# Patient Record
Sex: MALE | Race: WHITE | Employment: FULL TIME | URBAN - METROPOLITAN AREA
[De-identification: names, ages, dates, MRNs, and addresses within clinical notes are randomized per-mention and may not be internally consistent; named-entity substitution may affect disease eponyms.]

---

## 2018-04-13 ENCOUNTER — OFFICE VISIT (OUTPATIENT)
Dept: URGENT CARE | Facility: PHYSICIAN GROUP | Age: 43
End: 2018-04-13
Payer: COMMERCIAL

## 2018-04-13 VITALS
HEIGHT: 70 IN | HEART RATE: 103 BPM | TEMPERATURE: 97.8 F | BODY MASS INDEX: 38.22 KG/M2 | DIASTOLIC BLOOD PRESSURE: 78 MMHG | SYSTOLIC BLOOD PRESSURE: 102 MMHG | WEIGHT: 267 LBS | OXYGEN SATURATION: 96 %

## 2018-04-13 DIAGNOSIS — R05.9 COUGH: ICD-10-CM

## 2018-04-13 DIAGNOSIS — J98.01 BRONCHOSPASM: ICD-10-CM

## 2018-04-13 PROCEDURE — 99204 OFFICE O/P NEW MOD 45 MIN: CPT | Performed by: NURSE PRACTITIONER

## 2018-04-13 RX ORDER — METHYLPREDNISOLONE 4 MG/1
4 TABLET ORAL DAILY
Qty: 1 KIT | Refills: 0 | Status: SHIPPED | OUTPATIENT
Start: 2018-04-13 | End: 2018-06-29

## 2018-04-13 RX ORDER — BENZONATATE 100 MG/1
100 CAPSULE ORAL 3 TIMES DAILY PRN
Qty: 60 CAP | Refills: 0 | Status: SHIPPED | OUTPATIENT
Start: 2018-04-13 | End: 2018-06-29

## 2018-04-13 RX ORDER — CODEINE PHOSPHATE AND GUAIFENESIN 10; 100 MG/5ML; MG/5ML
5 SOLUTION ORAL EVERY 4 HOURS PRN
Qty: 100 ML | Refills: 0 | Status: SHIPPED | OUTPATIENT
Start: 2018-04-13 | End: 2018-04-16

## 2018-04-13 RX ORDER — TADALAFIL 5 MG/1
5 TABLET ORAL PRN
COMMUNITY

## 2018-04-13 ASSESSMENT — ENCOUNTER SYMPTOMS
COUGH: 1
FEVER: 0
DIZZINESS: 0
SPUTUM PRODUCTION: 0
EYE PAIN: 0
MYALGIAS: 0
CHILLS: 0
NAUSEA: 0
VOMITING: 0
WHEEZING: 0
SORE THROAT: 0
SHORTNESS OF BREATH: 0

## 2018-04-14 NOTE — PROGRESS NOTES
Subjective:     Brant Ham is a 43 y.o. male who presents for Cough (Cough for over 1 month)  Patient presents to clinic today with complaints of a cough ×1 month. Patient was in [place] and was seen and treated with oral antibiotics mid March. Patient states  symptoms since resolved however a lingering cough has persisted. Patient states cough is Constant and worse at night, nonproductive. Patient denies any fevers, chills, body aches.     Cough   This is a new problem. The current episode started 1 to 4 weeks ago. The problem has been unchanged. The problem occurs constantly. The cough is non-productive. Associated symptoms include postnasal drip. Pertinent negatives include no chest pain, chills, fever, myalgias, nasal congestion, rash, sore throat, shortness of breath or wheezing. Nothing aggravates the symptoms. He has tried OTC cough suppressant for the symptoms. The treatment provided no relief. His past medical history is significant for bronchitis.     Past Medical History:   Diagnosis Date   • Low testosterone    History reviewed. No pertinent surgical history.  Social History     Social History   • Marital status:      Spouse name: N/A   • Number of children: N/A   • Years of education: N/A     Occupational History   • Not on file.     Social History Main Topics   • Smoking status: Never Smoker   • Smokeless tobacco: Never Used   • Alcohol use Not on file   • Drug use: Unknown   • Sexual activity: Not on file     Other Topics Concern   • Not on file     Social History Narrative   • No narrative on file    History reviewed. No pertinent family history. Review of Systems   Constitutional: Negative for chills and fever.   HENT: Positive for postnasal drip. Negative for sore throat.    Eyes: Negative for pain.   Respiratory: Positive for cough. Negative for sputum production, shortness of breath and wheezing.    Cardiovascular: Negative for chest pain.   Gastrointestinal: Negative for nausea  "and vomiting.   Genitourinary: Negative for hematuria.   Musculoskeletal: Negative for myalgias.   Skin: Negative for rash.   Neurological: Negative for dizziness.   No Known Allergies   Objective:   /78   Pulse (!) 103   Temp 36.6 °C (97.8 °F)   Ht 1.778 m (5' 10\")   Wt 121.1 kg (267 lb)   SpO2 96%   BMI 38.31 kg/m²   Physical Exam   Constitutional: He is oriented to person, place, and time. He appears well-developed and well-nourished. No distress.   HENT:   Head: Normocephalic and atraumatic.   Eyes: Conjunctivae and EOM are normal. Pupils are equal, round, and reactive to light.   Cardiovascular: Normal rate and regular rhythm.    No murmur heard.  Pulmonary/Chest: Effort normal and breath sounds normal. No respiratory distress. He has no decreased breath sounds. He has no wheezes. He has no rhonchi. He has no rales.   Coughing throughout exam on inspiration.   Abdominal: Soft. He exhibits no distension. There is no tenderness.   Neurological: He is alert and oriented to person, place, and time. He has normal reflexes. No sensory deficit.   Skin: Skin is warm and dry.   Psychiatric: He has a normal mood and affect.   Vitals reviewed.        Assessment/Plan:   Assessment    1. Cough  2. Bronchospasm-   benzonatate (TESSALON) 100 MG Cap; Take 1 Cap by mouth 3 times a day as needed for Cough.  Dispense: 60 Cap; Refill: 0  - guaifenesin-codeine (CHERATUSSIN AC) Solution oral solution; Take 5 mL by mouth every four hours as needed for Cough for up to 3 days.  Dispense: 100 mL; Refill: 0  - MethylPREDNISolone (MEDROL DOSEPAK) 4 MG Tablet Therapy Pack; Take 1 Tab by mouth every day.  Dispense: 1 Kit; Refill: 0    Patient without fevers, lung sounds clear to auscultation, no bacterial etiology suspected. Apprenda query was performed to review the . The patient appears appropriate to receive medication as prescribed.        Patient given precautionary s/sx that mandate immediate follow up and evaluation " in the ED. Advised of risks of not doing so.    DDX, Supportive care, and indications for immediate follow-up discussed with patient.    Instructed to return to clinic or nearest emergency department if we are not available for any change in condition, further concerns, or worsening of symptoms.    The patient demonstrated a good understanding and agreed with the treatment plan.;

## 2018-06-28 ENCOUNTER — HOSPITAL ENCOUNTER (OUTPATIENT)
Facility: MEDICAL CENTER | Age: 43
End: 2018-06-28
Attending: PHYSICIAN ASSISTANT
Payer: COMMERCIAL

## 2018-06-28 PROCEDURE — 87177 OVA AND PARASITES SMEARS: CPT

## 2018-06-29 ENCOUNTER — OFFICE VISIT (OUTPATIENT)
Dept: URGENT CARE | Facility: PHYSICIAN GROUP | Age: 43
End: 2018-06-29
Payer: COMMERCIAL

## 2018-06-29 VITALS
BODY MASS INDEX: 35.59 KG/M2 | OXYGEN SATURATION: 95 % | SYSTOLIC BLOOD PRESSURE: 110 MMHG | WEIGHT: 248.6 LBS | HEART RATE: 81 BPM | DIASTOLIC BLOOD PRESSURE: 60 MMHG | TEMPERATURE: 98.4 F | RESPIRATION RATE: 12 BRPM | HEIGHT: 70 IN

## 2018-06-29 DIAGNOSIS — Z78.9 RECENT FOREIGN TRAVEL: ICD-10-CM

## 2018-06-29 DIAGNOSIS — M79.9 SOFT TISSUE LESION OF FOOT: ICD-10-CM

## 2018-06-29 PROCEDURE — 99213 OFFICE O/P EST LOW 20 MIN: CPT | Performed by: PHYSICIAN ASSISTANT

## 2018-06-29 ASSESSMENT — ENCOUNTER SYMPTOMS
NEUROLOGICAL NEGATIVE: 1
MUSCULOSKELETAL NEGATIVE: 1
CARDIOVASCULAR NEGATIVE: 1
CONSTITUTIONAL NEGATIVE: 1
RESPIRATORY NEGATIVE: 1
GASTROINTESTINAL NEGATIVE: 1
ROS SKIN COMMENTS: SEE HPI

## 2018-06-29 NOTE — PROGRESS NOTES
"Subjective:      Brant Ham is a 43 y.o. male who presents with Blister (no present pain/itchiness, lumps located on the heel of L foot, 3 blisters, x3 weeks )        Blister     Patient presents today for 3 weeks of unchanging lumps, possible \"blisters\" inner aspect of his left heel.  Patient states his wife pointed it out to him a few weeks ago and he has been watching it but has not changed.  He states they are really symptom free, without pain, redness. He will directly press on them hard without pain but also without change, drainage.   He denies these spots anywhere else.  He does note change in footwear for work but that was a few months back.  He was in [place] recently and does express concern for parasites due to this.  Was in [place].  Denies any other symptoms or concerns.     Review of Systems   Constitutional: Negative.    Respiratory: Negative.    Cardiovascular: Negative.    Gastrointestinal: Negative.    Musculoskeletal: Negative.    Skin:        SEE HPI   Neurological: Negative.    Endo/Heme/Allergies: Negative.        PMH:  has a past medical history of Low testosterone.  MEDS:   Current Outpatient Prescriptions:   •  tadalafil (CIALIS) 5 MG tablet, Take 5 mg by mouth as needed for Erectile Dysfunction., Disp: , Rfl:   •  Testosterone (ANDROGEL TD), Apply  to skin as directed., Disp: , Rfl:   ALLERGIES: No Known Allergies  SURGHX: History reviewed. No pertinent surgical history.  SOCHX:  reports that he has never smoked. His smokeless tobacco use includes Chew. He reports that he drinks alcohol. He reports that he does not use drugs.  FH: Family history was reviewed, no pertinent findings to report   Objective:     /60   Pulse 81   Temp 36.9 °C (98.4 °F)   Resp 12   Ht 1.778 m (5' 10\")   Wt 112.8 kg (248 lb 9.6 oz)   SpO2 95%   BMI 35.67 kg/m²      Physical Exam   Constitutional: He is oriented to person, place, and time. He appears well-developed and well-nourished. No " distress.   HENT:   Head: Normocephalic and atraumatic.   Eyes: Conjunctivae and EOM are normal.   Neck: Normal range of motion. Neck supple.   Cardiovascular: Normal rate.    Pulmonary/Chest: Effort normal.   Musculoskeletal: Normal range of motion. He exhibits no edema or tenderness.        Feet:    Neurological: He is alert and oriented to person, place, and time.   Skin: Skin is warm and dry.   Psychiatric: He has a normal mood and affect. His behavior is normal.   Vitals reviewed.          Assessment/Plan:     1. Soft tissue lesion of foot  COMPLETE O&P    REFERRAL TO PODIATRY   2. Recent foreign travel  COMPLETE O&P         -non specific soft tissue/cutaneous nodules of medial aspect of left heel.   Possibly vascular/variscosity based on exam   -patient will be referred to follow up with Podiatry at this time to follow, recommend changing back into old work boots due to possibility shoes are causing irritation  - O&P ordered due to recent travel to [place].  The lesions due to appear to be cutaneous larva manifestation however discussed appropriate to rule out given recent travel.       Supportive care, differential diagnoses, and indications for immediate follow-up discussed with patient.   Pathogenesis of diagnosis discussed including typical length and natural progression.   Instructed to return to clinic or nearest emergency department for any change in condition, further concerns, or worsening of symptoms.  Patient states understanding of the plan of care and discharge instructions.        Ary Isaacs P.A.-C.

## 2018-07-03 DIAGNOSIS — M79.9 SOFT TISSUE LESION OF FOOT: ICD-10-CM

## 2018-07-03 DIAGNOSIS — Z78.9 RECENT FOREIGN TRAVEL: ICD-10-CM

## 2018-07-06 LAB
O+P SPEC MICRO: NORMAL
SIGNIFICANT IND 70042: NORMAL
SITE SITE: NORMAL
SOURCE SOURCE: NORMAL

## 2018-07-12 ENCOUNTER — TELEPHONE (OUTPATIENT)
Dept: URGENT CARE | Facility: PHYSICIAN GROUP | Age: 43
End: 2018-07-12

## 2018-07-12 NOTE — TELEPHONE ENCOUNTER
I called the pt and lvm with negative results. He was instructed to call back with any further questions / fv if sxs are not resolving.       Thanks

## 2020-11-28 ENCOUNTER — HOSPITAL ENCOUNTER (OUTPATIENT)
Dept: LAB | Facility: MEDICAL CENTER | Age: 45
End: 2020-11-28
Attending: ANESTHESIOLOGY
Payer: COMMERCIAL

## 2020-11-28 PROCEDURE — C9803 HOPD COVID-19 SPEC COLLECT: HCPCS

## 2020-11-28 PROCEDURE — U0003 INFECTIOUS AGENT DETECTION BY NUCLEIC ACID (DNA OR RNA); SEVERE ACUTE RESPIRATORY SYNDROME CORONAVIRUS 2 (SARS-COV-2) (CORONAVIRUS DISEASE [COVID-19]), AMPLIFIED PROBE TECHNIQUE, MAKING USE OF HIGH THROUGHPUT TECHNOLOGIES AS DESCRIBED BY CMS-2020-01-R: HCPCS

## 2020-11-29 LAB
COVID ORDER STATUS COVID19: NORMAL
SARS-COV-2 RNA RESP QL NAA+PROBE: NOTDETECTED
SPECIMEN SOURCE: NORMAL

## 2021-02-09 ENCOUNTER — PHYSICAL THERAPY (OUTPATIENT)
Dept: PHYSICAL THERAPY | Facility: REHABILITATION | Age: 46
End: 2021-02-09
Attending: ORTHOPAEDIC SURGERY
Payer: COMMERCIAL

## 2021-02-09 DIAGNOSIS — S46.011A STRAIN OF MUSCLE(S) AND TENDON(S) OF THE ROTATOR CUFF OF RIGHT SHOULDER, INITIAL ENCOUNTER: ICD-10-CM

## 2021-02-09 DIAGNOSIS — S43.431A SUPERIOR GLENOID LABRUM LESION OF RIGHT SHOULDER, INITIAL ENCOUNTER: ICD-10-CM

## 2021-02-09 DIAGNOSIS — M75.51 SUBACROMIAL BURSITIS OF RIGHT SHOULDER JOINT: ICD-10-CM

## 2021-02-09 PROCEDURE — 97162 PT EVAL MOD COMPLEX 30 MIN: CPT

## 2021-02-09 PROCEDURE — 97110 THERAPEUTIC EXERCISES: CPT

## 2021-02-09 SDOH — ECONOMIC STABILITY: GENERAL: QUALITY OF LIFE: GOOD

## 2021-02-09 ASSESSMENT — ENCOUNTER SYMPTOMS
PAIN TIMING: INTERMITTENT
PAIN TIMING: IN THE MORNING
PAIN SCALE: 0
ALLEVIATING FACTORS: POSITION CHANGE
PAIN SCALE AT HIGHEST: 3
ALLEVIATING FACTORS: STRETCHING
ALLEVIATING FACTORS: PAIN MEDICATION
QUALITY: TIGHTNESS
QUALITY: STABBING

## 2021-02-09 NOTE — OP THERAPY EVALUATION
Outpatient Physical Therapy  INITIAL EVALUATION    [facility] Physical Therapy [place] [address] [address] [employer] [facility], [address]  [facility] [address]  Phone:  [phone]    Date of Evaluation: 2021    Patient: Brant Ham  YOB: 1975  MRN: [id number]     Referring Provider: Giancarlo Knapp M.D.  [address] [place] [address]   Referring Diagnosis Superior glenoid labrum lesion of right shoulder, initial encounter [S43.431A];Subacromial bursitis of right shoulder joint [M75.51];Strain of muscle(s) and tendon(s) of the rotator cuff of right shoulder, initial encounter [S46.011A]     Time Calculation    Start time: 0230  Stop time: 0330 Time Calculation (min): 60 minutes         Chief Complaint: Shoulder Problem and Shoulder Injury    Visit Diagnoses     ICD-10-CM   1. Superior glenoid labrum lesion of right shoulder, initial encounter  S43.431A   2. Subacromial bursitis of right shoulder joint  M75.51   3. Strain of muscle(s) and tendon(s) of the rotator cuff of right shoulder, initial encounter  S46.011A         Subjective:   History of Present Illness:     Date of onset:  2021    Mechanism of injury:  The patient is a 45 year old male who reports post op (R) shoulder RTC surgery. The patient reports having decreased AROM in the (R) shoulder reaching out to  the side. The patient has limited problems with sleeping on his (R) side. He reports that his motion has graduall increase d but still feels weak and unable to perform certatin functional movements involving the (R) upper extremity.  Quality of life:  Good  Sleep disturbance:  Interrupted sleep  Pain:     Current pain ratin    At worst pain rating:  3    Quality:  Stabbing and tightness    Pain timing:  Intermittent and in the morning    Relieving factors:  Pain medication, position change and stretching    Pain Comments::  Wax on wax off car with using (R) hand; pulling up his underwear   Hand dominance:  Right  Patient  "Goals:     Other patient goals:  To increase mobility and decrease pain; to increase his symmetrical balcce       Past Medical History:   Diagnosis Date   • Low testosterone      No past surgical history on file.  Social History     Tobacco Use   • Smoking status: Never Smoker   • Smokeless tobacco: Current User     Types: Chew   Substance Use Topics   • Alcohol use: Yes     Comment: Occ     Family and Occupational History     Socioeconomic History   • Marital status:      Spouse name: Not on file   • Number of children: Not on file   • Years of education: Not on file   • Highest education level: Not on file   Occupational History   • Not on file       Objective     Tenderness     Right Shoulder  No tenderness     Active Range of Motion   Left Shoulder   Flexion: 175 degrees   Extension: 55 degrees   Abduction: 170 degrees   External rotation BTH: T2   Internal rotation BTB: T7     Right Shoulder   Flexion: 165 degrees   Extension: 55 degrees   Abduction: 160 degrees   External rotation BTH: C6   Internal rotation BTB: L3     Strength:      Left Shoulder   Planes of Motion   Flexion: 5   Extension: 5   Abduction: 5   Adduction: 5   External rotation at 0°: 5     Right Shoulder   Planes of Motion   Flexion: 4   Extension: 4+   Abduction: 4   Adduction: 4+   External rotation BTH: 4   Internal rotation BTB: 4     Tests     Right Shoulder   Positive painful arc.         Therapeutic Exercises (CPT 31710):     1. (R) UE wall walk , 3'     2. (R) UE ball on the wall , 3\"    3. Wall push-ups , 10x    Therapeutic Treatments and Modalities:     1. Manual Therapy (CPT 81349), (R) sh, PROM in end ranges of flexion, abduction and ER/IR     Time-based treatments/modalities:    Physical Therapy Timed Treatment Charges  Therapeutic exercise minutes (CPT 65804): 15 minutes      Assessment, Response and Plan:   Impairments: impaired functional mobility, impaired physical strength and lacks appropriate home exercise program  "   Assessment details:  The patient is a 45 year old male who is post op (R) shoulder RTC surgery ~ 12/3/20. He reports gradually getting back in functional motion to the (R) UE. He is limited still with reaching out to the side and sleeping to the (R) side at night; also washing and drying his car. AROm to the (R) UE is limited in flexion, abduction and ER/IR. Strength to the (R) UE is at 4/5 to 4+/5 currently. He would benefit from skilled physical therapy to address post op (R) shoulder weakness working on strength and conditioning, AROM and mobility manual therapy techniques and functional activity.  Barriers to therapy:  None  Prognosis: good    Goals:   Short Term Goals:   1) Indep with HEP   2) increase AROm in the (R) shoulder flexion/abduction by 5-10 deg  Short term goal time span:  2-4 weeks      Long Term Goals:    1) Progression/regressions advancing HEP   2) Able to perform overhead activity with (R) reaching into cabinets.  3) Increase (R) sh flexion/abduction strength to 4+/5 to 5/5 without pain.  Long term goal time span:  4-6 weeks    Plan:   Therapy options:  Physical therapy treatment to continue  Planned therapy interventions:  E Stim Unattended (CPT 08387), Functional Training, Self Care (CPT 13723), Manual Therapy (CPT 66635), Neuromuscular Re-education (CPT 14808), Self Care ADL Training (CPT 82258), Therapeutic Activities (CPT 80476) and Therapeutic Exercise (CPT 69696)  Frequency:  2x week  Duration in weeks:  6  Duration in visits:  12  Discussed with:  Patient      Functional Assessment Used        Referring provider co-signature:  I have reviewed this plan of care and my co-signature certifies the need for services.    Certification Period: 02/09/2021 to  03/23/21    Physician Signature: ________________________________ Date: ______________

## 2021-02-11 ENCOUNTER — PHYSICAL THERAPY (OUTPATIENT)
Dept: PHYSICAL THERAPY | Facility: REHABILITATION | Age: 46
End: 2021-02-11
Attending: ORTHOPAEDIC SURGERY
Payer: COMMERCIAL

## 2021-02-11 DIAGNOSIS — M75.51 SUBACROMIAL BURSITIS OF RIGHT SHOULDER JOINT: ICD-10-CM

## 2021-02-11 DIAGNOSIS — S43.431A SUPERIOR GLENOID LABRUM LESION OF RIGHT SHOULDER, INITIAL ENCOUNTER: ICD-10-CM

## 2021-02-11 PROCEDURE — 97140 MANUAL THERAPY 1/> REGIONS: CPT

## 2021-02-11 PROCEDURE — 97110 THERAPEUTIC EXERCISES: CPT

## 2021-02-11 NOTE — OP THERAPY DAILY TREATMENT
"  Outpatient Physical Therapy  DAILY TREATMENT     [facility] Physical Therapy [address], [address]  [place] [address]  Phone:  [phone]    Date: 02/11/2021    Patient: Brant Ham  YOB: 1975  MRN: [id number]     Time Calculation    Start time: 0800  Stop time: 0830 Time Calculation (min): 30 minutes         Chief Complaint: Shoulder Problem    Visit #: 2    SUBJECTIVE:  The patient reports having stiffness to the (R) shoulder following  and evaluation exercises    OBJECTIVE:  Current objective measures:        Increase mobility to the (R) shoulder       Therapeutic Exercises (CPT 14097):     1. (R) UE wall walk , 3'     2. (R) UE ball on the wall , 3\"    3. Wall push-ups , 10x    4. Towel stretch    5. Dowel sh flexion/ abduction, 20x    6. Shoulder raises     7. Shoulder shrugs    8. Prone Sh extension    9. Supine horizontal adduction     10. Sidelying ER     Therapeutic Treatments and Modalities:     1. Manual Therapy (CPT 05121), (R) sh, PROM in end ranges of flexion, abduction and ER/IR , (R) sh , PNF and PAMS with light isometric functional resistance in all directss    Time-based treatments/modalities:    Physical Therapy Timed Treatment Charges  Manual therapy minutes (CPT 78997): 10 minutes  Therapeutic exercise minutes (CPT 55854): 20 minutes      ASSESSMENT:   Response to treatment:   Patient given PROM/PAMS with light resistance in all directions; at (R) sh horizntal abduction at 90 deg patient had pain with light resitance but tolerated; Patient has some increased pain with (R) sh abduction at 120 deg using dowel     PLAN/RECOMMENDATIONS:   Plan for treatment: therapy treatment to continue next visit.  Planned interventions for next visit: continue with current treatment.       "

## 2021-02-26 ENCOUNTER — APPOINTMENT (OUTPATIENT)
Dept: PHYSICAL THERAPY | Facility: REHABILITATION | Age: 46
End: 2021-02-26
Attending: ORTHOPAEDIC SURGERY
Payer: COMMERCIAL

## 2021-03-05 ENCOUNTER — APPOINTMENT (OUTPATIENT)
Dept: PHYSICAL THERAPY | Facility: REHABILITATION | Age: 46
End: 2021-03-05
Attending: ORTHOPAEDIC SURGERY
Payer: COMMERCIAL

## 2021-03-12 ENCOUNTER — APPOINTMENT (OUTPATIENT)
Dept: PHYSICAL THERAPY | Facility: REHABILITATION | Age: 46
End: 2021-03-12
Payer: COMMERCIAL

## 2021-03-19 ENCOUNTER — APPOINTMENT (OUTPATIENT)
Dept: PHYSICAL THERAPY | Facility: REHABILITATION | Age: 46
End: 2021-03-19
Payer: COMMERCIAL

## 2021-03-26 ENCOUNTER — APPOINTMENT (OUTPATIENT)
Dept: PHYSICAL THERAPY | Facility: REHABILITATION | Age: 46
End: 2021-03-26
Payer: COMMERCIAL

## 2022-07-31 ENCOUNTER — OFFICE VISIT (OUTPATIENT)
Dept: URGENT CARE | Facility: PHYSICIAN GROUP | Age: 47
End: 2022-07-31
Payer: COMMERCIAL

## 2022-07-31 VITALS
DIASTOLIC BLOOD PRESSURE: 76 MMHG | RESPIRATION RATE: 14 BRPM | BODY MASS INDEX: 38.51 KG/M2 | SYSTOLIC BLOOD PRESSURE: 112 MMHG | HEIGHT: 69 IN | HEART RATE: 84 BPM | WEIGHT: 260 LBS | TEMPERATURE: 97.8 F | OXYGEN SATURATION: 96 %

## 2022-07-31 DIAGNOSIS — J32.9 VIRAL SINUSITIS: ICD-10-CM

## 2022-07-31 DIAGNOSIS — Z11.52 ENCOUNTER FOR SCREENING FOR COVID-19: ICD-10-CM

## 2022-07-31 DIAGNOSIS — B97.89 VIRAL SINUSITIS: ICD-10-CM

## 2022-07-31 PROBLEM — N40.1 BENIGN PROSTATIC HYPERPLASIA WITH URINARY OBSTRUCTION: Status: ACTIVE | Noted: 2021-09-24

## 2022-07-31 PROBLEM — N13.8 BENIGN PROSTATIC HYPERPLASIA WITH URINARY OBSTRUCTION: Status: ACTIVE | Noted: 2021-09-24

## 2022-07-31 PROBLEM — R79.89 LOW TESTOSTERONE: Status: ACTIVE | Noted: 2022-04-19

## 2022-07-31 PROBLEM — F51.01 PRIMARY INSOMNIA: Status: ACTIVE | Noted: 2022-04-19

## 2022-07-31 PROBLEM — N52.9 ERECTILE DYSFUNCTION: Status: ACTIVE | Noted: 2021-09-24

## 2022-07-31 PROBLEM — D75.1 SECONDARY POLYCYTHEMIA: Status: ACTIVE | Noted: 2021-09-24

## 2022-07-31 PROBLEM — E78.00 HIGH CHOLESTEROL: Status: ACTIVE | Noted: 2022-04-19

## 2022-07-31 LAB
EXTERNAL QUALITY CONTROL: NORMAL
SARS-COV+SARS-COV-2 AG RESP QL IA.RAPID: NEGATIVE

## 2022-07-31 PROCEDURE — 99203 OFFICE O/P NEW LOW 30 MIN: CPT | Mod: CS | Performed by: PHYSICIAN ASSISTANT

## 2022-07-31 PROCEDURE — 87426 SARSCOV CORONAVIRUS AG IA: CPT | Performed by: PHYSICIAN ASSISTANT

## 2022-07-31 RX ORDER — ONDANSETRON 4 MG/1
TABLET, FILM COATED ORAL
COMMUNITY
End: 2023-10-25

## 2022-07-31 RX ORDER — ATORVASTATIN CALCIUM 20 MG/1
20 TABLET, FILM COATED ORAL
COMMUNITY
Start: 2022-07-01 | End: 2024-02-11

## 2022-07-31 RX ORDER — TESTOSTERONE CYPIONATE 200 MG/ML
INJECTION, SOLUTION INTRAMUSCULAR
COMMUNITY
Start: 2022-06-13 | End: 2024-02-11

## 2022-07-31 ASSESSMENT — ENCOUNTER SYMPTOMS
DIZZINESS: 0
CONSTIPATION: 0
EYE DISCHARGE: 0
COUGH: 0
FEVER: 0
SHORTNESS OF BREATH: 0
SINUS PAIN: 0
VOMITING: 0
WHEEZING: 0
NAUSEA: 0
DIARRHEA: 0
EYE PAIN: 0
CHILLS: 0
EYE REDNESS: 0
DIAPHORESIS: 0
SORE THROAT: 0
HEADACHES: 0
ABDOMINAL PAIN: 0

## 2022-08-02 ENCOUNTER — OFFICE VISIT (OUTPATIENT)
Dept: URGENT CARE | Facility: PHYSICIAN GROUP | Age: 47
End: 2022-08-02
Payer: COMMERCIAL

## 2022-08-02 VITALS
BODY MASS INDEX: 38.51 KG/M2 | SYSTOLIC BLOOD PRESSURE: 110 MMHG | WEIGHT: 260 LBS | RESPIRATION RATE: 18 BRPM | TEMPERATURE: 97.6 F | HEIGHT: 69 IN | OXYGEN SATURATION: 100 % | DIASTOLIC BLOOD PRESSURE: 68 MMHG | HEART RATE: 99 BPM

## 2022-08-02 DIAGNOSIS — Z20.822 SUSPECTED COVID-19 VIRUS INFECTION: ICD-10-CM

## 2022-08-02 DIAGNOSIS — U07.1 COVID-19: ICD-10-CM

## 2022-08-02 DIAGNOSIS — M54.50 BILATERAL LOW BACK PAIN WITHOUT SCIATICA, UNSPECIFIED CHRONICITY: ICD-10-CM

## 2022-08-02 LAB
EXTERNAL QUALITY CONTROL: ABNORMAL
SARS-COV+SARS-COV-2 AG RESP QL IA.RAPID: POSITIVE

## 2022-08-02 PROCEDURE — 87426 SARSCOV CORONAVIRUS AG IA: CPT | Performed by: PHYSICIAN ASSISTANT

## 2022-08-02 PROCEDURE — 99214 OFFICE O/P EST MOD 30 MIN: CPT | Mod: CS | Performed by: PHYSICIAN ASSISTANT

## 2022-08-02 RX ORDER — CYCLOBENZAPRINE HCL 5 MG
5 TABLET ORAL 3 TIMES DAILY PRN
Qty: 12 TABLET | Refills: 0 | Status: SHIPPED | OUTPATIENT
Start: 2022-08-02 | End: 2023-10-25

## 2022-08-02 ASSESSMENT — ENCOUNTER SYMPTOMS
RHINORRHEA: 1
WHEEZING: 0
SORE THROAT: 0
HEADACHES: 1
HEMOPTYSIS: 0
CHILLS: 0
FEVER: 0
MYALGIAS: 1
COUGH: 1

## 2023-10-25 PROBLEM — S92.351A CLOSED DISPLACED FRACTURE OF FIFTH METATARSAL BONE OF RIGHT FOOT: Status: ACTIVE | Noted: 2023-10-25

## 2024-02-11 ENCOUNTER — OFFICE VISIT (OUTPATIENT)
Dept: URGENT CARE | Facility: PHYSICIAN GROUP | Age: 49
End: 2024-02-11
Payer: COMMERCIAL

## 2024-02-11 VITALS
DIASTOLIC BLOOD PRESSURE: 70 MMHG | WEIGHT: 268 LBS | SYSTOLIC BLOOD PRESSURE: 118 MMHG | OXYGEN SATURATION: 94 % | TEMPERATURE: 97.8 F | BODY MASS INDEX: 39.69 KG/M2 | RESPIRATION RATE: 16 BRPM | HEIGHT: 69 IN | HEART RATE: 106 BPM

## 2024-02-11 DIAGNOSIS — R05.1 ACUTE COUGH: ICD-10-CM

## 2024-02-11 DIAGNOSIS — M94.0 COSTOCHONDRITIS, ACUTE: ICD-10-CM

## 2024-02-11 PROBLEM — N52.9 ERECTILE DYSFUNCTION: Status: ACTIVE | Noted: 2021-09-23

## 2024-02-11 PROBLEM — D75.1 SECONDARY POLYCYTHEMIA: Status: ACTIVE | Noted: 2021-09-23

## 2024-02-11 PROBLEM — R74.01 ELEVATED LIVER TRANSAMINASE LEVEL: Status: ACTIVE | Noted: 2023-06-28

## 2024-02-11 PROBLEM — N52.01 ERECTILE DYSFUNCTION DUE TO ARTERIAL INSUFFICIENCY: Status: ACTIVE | Noted: 2022-09-29

## 2024-02-11 PROBLEM — G47.30 MILD SLEEP APNEA: Status: ACTIVE | Noted: 2023-06-28

## 2024-02-11 PROCEDURE — 99213 OFFICE O/P EST LOW 20 MIN: CPT

## 2024-02-11 PROCEDURE — 3078F DIAST BP <80 MM HG: CPT

## 2024-02-11 PROCEDURE — 3074F SYST BP LT 130 MM HG: CPT

## 2024-02-11 RX ORDER — TESTOSTERONE CYPIONATE 200 MG/ML
INJECTION, SOLUTION INTRAMUSCULAR
COMMUNITY

## 2024-02-11 RX ORDER — ATORVASTATIN CALCIUM 10 MG/1
1 TABLET, FILM COATED ORAL
COMMUNITY
Start: 2023-12-27 | End: 2024-02-11

## 2024-02-11 RX ORDER — CYCLOBENZAPRINE HCL 5 MG
1 TABLET ORAL 3 TIMES DAILY PRN
COMMUNITY
End: 2024-02-11

## 2024-02-11 RX ORDER — BENZONATATE 100 MG/1
100 CAPSULE ORAL 3 TIMES DAILY PRN
Qty: 30 CAPSULE | Refills: 0 | Status: SHIPPED | OUTPATIENT
Start: 2024-02-11

## 2024-02-11 RX ORDER — TADALAFIL 5 MG/1
1 TABLET ORAL
COMMUNITY
Start: 2023-07-04 | End: 2024-02-11

## 2024-02-11 RX ORDER — ATORVASTATIN CALCIUM 20 MG/1
1 TABLET, FILM COATED ORAL
COMMUNITY
End: 2024-02-11

## 2024-02-11 ASSESSMENT — ENCOUNTER SYMPTOMS
COUGH: 1
CHILLS: 0
FEVER: 0
MYALGIAS: 0

## 2024-02-11 NOTE — PROGRESS NOTES
Subjective     Brant Ham is a 48 y.o. male who presents with Cough (Cough when lying down, x9 weeks  )            Cough  This is a new problem. The current episode started 1 to 4 weeks ago. The problem has been unchanged. The cough is Non-productive. Pertinent negatives include no chills, fever or myalgias. The symptoms are aggravated by lying down. Treatments tried: Nyquil. The treatment provided moderate relief.     Patient presents with symptoms that started 9 weeks ago.  He endorses cough that is mostly dry but intermittently productive of clear mucus.  Coughing is worse at night when he is laying supine.  He denies any fever, chills.  He does report rhinorrhea and nasal congestion that is chronic but currently not taking any medications for this.  He takes NyQuil at night which provided significant relief.  He reports having taken Tessalon Perles in the past which provided significant relief as well.  Patient also reports some pain in the right anterior chest wall area by the ribs worse with deep breathing.    Patient's current problem list, medications, and past medical/surgical history were reviewed in Epic.    PMH:  has a past medical history of Low testosterone.  MEDS:   Current Outpatient Medications:     testosterone cypionate (DEPO-TESTOSTERONE) 200 MG/ML injection, testosterone cypionate 200 mg/mL intramuscular oil  INJECT 1 ML EVERY WEEK BY INTRAMUSCULAR ROUTE FOR 91 DAYS., Disp: , Rfl:     tadalafil (CIALIS) 5 MG tablet, Take 5 mg by mouth as needed for Erectile Dysfunction., Disp: , Rfl:   ALLERGIES: No Known Allergies  SURGHX:   Past Surgical History:   Procedure Laterality Date    [place] OPEN TREATMENT METATARSAL FRACTURE EACH Right 10/31/2023    Procedure: RIGHT FOOT NO FRACTURE OPEN REDUCTION INTERNAL FIXATION;  Surgeon: Med Chew M.D.;  Location: [facility];  Service: Orthopedics     SOCHX:  reports that he has never smoked. His smokeless tobacco use includes  "chew. He reports current alcohol use. He reports that he does not use drugs.  FH: Reviewed with patient, not pertinent to this visit.     Review of Systems   Constitutional:  Negative for chills, fever and malaise/fatigue.   HENT:  Positive for congestion (chronic).    Respiratory:  Positive for cough.    Musculoskeletal:  Negative for myalgias.   All other systems reviewed and are negative.             Objective     /70 (BP Location: Left arm, Patient Position: Sitting, BP Cuff Size: Large adult)   Pulse (!) 106   Temp 36.6 °C (97.8 °F) (Temporal)   Resp 16   Ht 1.753 m (5' 9\")   Wt 122 kg (268 lb)   SpO2 94%   BMI 39.58 kg/m²      Physical Exam  Vitals reviewed.   Constitutional:       Appearance: Normal appearance.   HENT:      Head: Normocephalic.      Nose: Nose normal.   Eyes:      Extraocular Movements: Extraocular movements intact.   Cardiovascular:      Rate and Rhythm: Normal rate and regular rhythm.      Pulses: Normal pulses.      Heart sounds: Normal heart sounds.   Pulmonary:      Effort: Pulmonary effort is normal.      Breath sounds: Normal breath sounds. No wheezing or rales.   Musculoskeletal:         General: Normal range of motion.      Cervical back: Normal range of motion.   Skin:     General: Skin is warm.   Neurological:      General: No focal deficit present.      Mental Status: He is alert.   Psychiatric:         Mood and Affect: Mood normal.         Behavior: Behavior normal.                 Assessment & Plan        1. Acute cough    - benzonatate (TESSALON) 100 MG Cap; Take 1 Capsule by mouth 3 times a day as needed for Cough.  Dispense: 30 Capsule; Refill: 0    2. Costochondritis, acute    Patient's physical examination is unremarkable.  His lungs are clear to auscultation.  Discussed possible causes including but not limited to environmental allergies, GERD, or her upper respiratory tract infection likely viral.  May take Tessalon Perles 3 times daily as needed for cough.  " Advised to take OTC antihistamine and Flonase nasal spray daily for allergies.  Recommended not going to bed right after heavy meal and keeping head elevated.  May take Tums or PPIs to eliminate GERD as a cause of the cough.  Patient most likely has acute costochondritis from coughing.  May take NSAIDs as needed.  Ice pack/cold compress to area for additional relief.  Instructed to return to clinic if worsening or persistent symptoms.  Discussed treatment plan with patient, she is agreeable and verbalized understanding.  Educated patient on signs and symptoms watch out for, when to return to the clinic or go to the ER.    Electronically Signed by KSENIA Schafer

## 2024-06-28 ENCOUNTER — HOSPITAL ENCOUNTER (OUTPATIENT)
Dept: LAB | Facility: MEDICAL CENTER | Age: 49
End: 2024-06-28
Payer: COMMERCIAL

## 2024-06-28 LAB
ALBUMIN SERPL BCP-MCNC: 4.5 G/DL (ref 3.2–4.9)
ALBUMIN/GLOB SERPL: 1.7 G/DL
ALP SERPL-CCNC: 59 U/L (ref 30–99)
ALT SERPL-CCNC: 42 U/L (ref 2–50)
ANION GAP SERPL CALC-SCNC: 13 MMOL/L (ref 7–16)
AST SERPL-CCNC: 30 U/L (ref 12–45)
BASOPHILS # BLD AUTO: 0.8 % (ref 0–1.8)
BASOPHILS # BLD: 0.05 K/UL (ref 0–0.12)
BILIRUB SERPL-MCNC: 0.8 MG/DL (ref 0.1–1.5)
BUN SERPL-MCNC: 22 MG/DL (ref 8–22)
CALCIUM ALBUM COR SERPL-MCNC: 8.9 MG/DL (ref 8.5–10.5)
CALCIUM SERPL-MCNC: 9.3 MG/DL (ref 8.5–10.5)
CHLORIDE SERPL-SCNC: 103 MMOL/L (ref 96–112)
CHOLEST SERPL-MCNC: 179 MG/DL (ref 100–199)
CO2 SERPL-SCNC: 22 MMOL/L (ref 20–33)
CREAT SERPL-MCNC: 1.32 MG/DL (ref 0.5–1.4)
EOSINOPHIL # BLD AUTO: 0.41 K/UL (ref 0–0.51)
EOSINOPHIL NFR BLD: 7 % (ref 0–6.9)
ERYTHROCYTE [DISTWIDTH] IN BLOOD BY AUTOMATED COUNT: 38.5 FL (ref 35.9–50)
GFR SERPLBLD CREATININE-BSD FMLA CKD-EPI: 66 ML/MIN/1.73 M 2
GLOBULIN SER CALC-MCNC: 2.6 G/DL (ref 1.9–3.5)
GLUCOSE SERPL-MCNC: 105 MG/DL (ref 65–99)
HCT VFR BLD AUTO: 47 % (ref 42–52)
HDLC SERPL-MCNC: 64 MG/DL
HGB BLD-MCNC: 16.7 G/DL (ref 14–18)
IMM GRANULOCYTES # BLD AUTO: 0.01 K/UL (ref 0–0.11)
IMM GRANULOCYTES NFR BLD AUTO: 0.2 % (ref 0–0.9)
LDLC SERPL CALC-MCNC: 103 MG/DL
LYMPHOCYTES # BLD AUTO: 2.03 K/UL (ref 1–4.8)
LYMPHOCYTES NFR BLD: 34.5 % (ref 22–41)
MCH RBC QN AUTO: 32.7 PG (ref 27–33)
MCHC RBC AUTO-ENTMCNC: 35.5 G/DL (ref 32.3–36.5)
MCV RBC AUTO: 92 FL (ref 81.4–97.8)
MONOCYTES # BLD AUTO: 0.52 K/UL (ref 0–0.85)
MONOCYTES NFR BLD AUTO: 8.8 % (ref 0–13.4)
NEUTROPHILS # BLD AUTO: 2.87 K/UL (ref 1.82–7.42)
NEUTROPHILS NFR BLD: 48.7 % (ref 44–72)
NRBC # BLD AUTO: 0 K/UL
NRBC BLD-RTO: 0 /100 WBC (ref 0–0.2)
PLATELET # BLD AUTO: 170 K/UL (ref 164–446)
PMV BLD AUTO: 11.4 FL (ref 9–12.9)
POTASSIUM SERPL-SCNC: 4.1 MMOL/L (ref 3.6–5.5)
PROT SERPL-MCNC: 7.1 G/DL (ref 6–8.2)
RBC # BLD AUTO: 5.11 M/UL (ref 4.7–6.1)
SODIUM SERPL-SCNC: 138 MMOL/L (ref 135–145)
TRIGL SERPL-MCNC: 60 MG/DL (ref 0–149)
WBC # BLD AUTO: 5.9 K/UL (ref 4.8–10.8)

## 2024-06-28 PROCEDURE — 36415 COLL VENOUS BLD VENIPUNCTURE: CPT

## 2024-06-28 PROCEDURE — 84403 ASSAY OF TOTAL TESTOSTERONE: CPT

## 2024-06-28 PROCEDURE — 80061 LIPID PANEL: CPT

## 2024-06-28 PROCEDURE — 80053 COMPREHEN METABOLIC PANEL: CPT

## 2024-06-28 PROCEDURE — 84402 ASSAY OF FREE TESTOSTERONE: CPT

## 2024-06-28 PROCEDURE — 85025 COMPLETE CBC W/AUTO DIFF WBC: CPT

## 2024-06-28 PROCEDURE — 84270 ASSAY OF SEX HORMONE GLOBUL: CPT

## 2024-06-30 LAB
SHBG SERPL-SCNC: 68 NMOL/L (ref 19–76)
TESTOST FREE MFR SERPL: 1.3 % (ref 1.6–2.9)
TESTOST FREE SERPL-MCNC: 96 PG/ML (ref 47–244)
TESTOST SERPL-MCNC: 759 NG/DL (ref 300–890)

## 2025-04-09 ENCOUNTER — OFFICE VISIT (OUTPATIENT)
Dept: URGENT CARE | Facility: PHYSICIAN GROUP | Age: 50
End: 2025-04-09
Payer: COMMERCIAL

## 2025-04-09 VITALS
TEMPERATURE: 97.5 F | OXYGEN SATURATION: 95 % | RESPIRATION RATE: 16 BRPM | HEIGHT: 69 IN | WEIGHT: 268 LBS | HEART RATE: 95 BPM | DIASTOLIC BLOOD PRESSURE: 74 MMHG | BODY MASS INDEX: 39.69 KG/M2 | SYSTOLIC BLOOD PRESSURE: 118 MMHG

## 2025-04-09 DIAGNOSIS — J01.90 ACUTE BACTERIAL RHINOSINUSITIS: ICD-10-CM

## 2025-04-09 DIAGNOSIS — J40 BRONCHITIS: ICD-10-CM

## 2025-04-09 DIAGNOSIS — B96.89 ACUTE BACTERIAL RHINOSINUSITIS: ICD-10-CM

## 2025-04-09 DIAGNOSIS — R06.2 WHEEZING: ICD-10-CM

## 2025-04-09 PROBLEM — M54.50 CHRONIC RIGHT-SIDED LOW BACK PAIN WITHOUT SCIATICA: Status: ACTIVE | Noted: 2024-06-24

## 2025-04-09 PROBLEM — G89.29 CHRONIC RIGHT-SIDED LOW BACK PAIN WITHOUT SCIATICA: Status: ACTIVE | Noted: 2024-06-24

## 2025-04-09 PROCEDURE — 99213 OFFICE O/P EST LOW 20 MIN: CPT

## 2025-04-09 PROCEDURE — 3078F DIAST BP <80 MM HG: CPT

## 2025-04-09 PROCEDURE — 94640 AIRWAY INHALATION TREATMENT: CPT | Mod: 76

## 2025-04-09 PROCEDURE — 3074F SYST BP LT 130 MM HG: CPT

## 2025-04-09 RX ORDER — BENZONATATE 200 MG/1
200 CAPSULE ORAL 3 TIMES DAILY PRN
Qty: 60 CAPSULE | Refills: 0 | Status: SHIPPED | OUTPATIENT
Start: 2025-04-09

## 2025-04-09 RX ORDER — IBUPROFEN 600 MG/1
TABLET, FILM COATED ORAL
COMMUNITY

## 2025-04-09 RX ORDER — GUAIFENESIN 600 MG/1
600 TABLET, EXTENDED RELEASE ORAL EVERY 12 HOURS
Qty: 28 TABLET | Refills: 0 | Status: SHIPPED | OUTPATIENT
Start: 2025-04-09 | End: 2025-04-23

## 2025-04-09 RX ORDER — GABAPENTIN 300 MG/1
CAPSULE ORAL
COMMUNITY

## 2025-04-09 RX ORDER — METHYLPREDNISOLONE 4 MG/1
TABLET ORAL
Qty: 21 TABLET | Refills: 0 | Status: SHIPPED | OUTPATIENT
Start: 2025-04-09

## 2025-04-09 RX ORDER — ALBUTEROL SULFATE 90 UG/1
2 INHALANT RESPIRATORY (INHALATION) EVERY 6 HOURS PRN
Qty: 8.5 G | Refills: 0 | Status: SHIPPED | OUTPATIENT
Start: 2025-04-09

## 2025-04-09 RX ORDER — MELOXICAM 7.5 MG/1
1 TABLET ORAL
COMMUNITY

## 2025-04-09 RX ORDER — IPRATROPIUM BROMIDE AND ALBUTEROL SULFATE 2.5; .5 MG/3ML; MG/3ML
3 SOLUTION RESPIRATORY (INHALATION) ONCE
Status: COMPLETED | OUTPATIENT
Start: 2025-04-09 | End: 2025-04-09

## 2025-04-09 RX ORDER — ALBUTEROL SULFATE 0.83 MG/ML
2.5 SOLUTION RESPIRATORY (INHALATION) ONCE
Status: COMPLETED | OUTPATIENT
Start: 2025-04-09 | End: 2025-04-09

## 2025-04-09 RX ORDER — HYDROXYZINE PAMOATE 25 MG/1
CAPSULE ORAL
COMMUNITY

## 2025-04-09 RX ADMIN — ALBUTEROL SULFATE 2.5 MG: 0.83 SOLUTION RESPIRATORY (INHALATION) at 17:48

## 2025-04-09 RX ADMIN — IPRATROPIUM BROMIDE AND ALBUTEROL SULFATE 3 ML: 2.5; .5 SOLUTION RESPIRATORY (INHALATION) at 18:21

## 2025-04-09 ASSESSMENT — FIBROSIS 4 INDEX: FIB4 SCORE: 1.36

## 2025-04-10 NOTE — PROGRESS NOTES
Subjective:   Brant Ham is a 49 y.o. male who presents for Cough (X3 weeks)      HPI:    Brant Ham presents to Urgent Care with a persistent cough of 3 weeks duration. The patient reports a history of bronchitis occurring approximately every 5 years, lasting up to 6 months. The cough began 3 weeks ago, which the patient attributes to exposure from his boss who has been coughing for 4 weeks. Unlike his boss, the patient did not experience a fever at the onset. He endorses cold like symptoms at the start of his illness three weeks ago: rhinorrhea, nasal congestion, sore throat The cough is productive, with the patient reporting phlegm production. She experiences wheezing at the beginning and end of her breathing. The cough worsens at night when lying down. Patient reports he attempted to alleviate his symptoms last night by flossing, brushing his teeth, gargling with Chloraseptic, blowing his nose, and using saline fluid, which seemed to help.  Denies CP, SOB, dizziness, palpitations, leg swelling, orthopnea. Denies fever, chills, body aches, sore throat. Denies any history of asthma, smoking, or pneumonia.       ROS As above in HPI    Medications:    Current Outpatient Medications on File Prior to Visit   Medication Sig Dispense Refill    testosterone cypionate (DEPO-TESTOSTERONE) 200 MG/ML injection testosterone cypionate 200 mg/mL intramuscular oil   INJECT 1 ML EVERY WEEK BY INTRAMUSCULAR ROUTE FOR 91 DAYS.      tadalafil (CIALIS) 5 MG tablet Take 5 mg by mouth as needed for Erectile Dysfunction.      gabapentin (NEURONTIN) 300 MG Cap TAKE 1 CAPSULE BY MOUTH EVERY DAY AT BEDTIME AS NEEDED FOR NERVE PAIN (Patient not taking: Reported on 4/9/2025)      hydrOXYzine pamoate (VISTARIL) 25 MG Cap TAKE 1 CAPSULE BY MOUTH 3 TIMES A DAY AS NEEDED FOR ITCHING OR OTHER (AND NAUSEA) FOR UP TO 3 DAYS. (Patient not taking: Reported on 4/9/2025)      meloxicam (MOBIC) 7.5 MG Tab Take 1 Tablet by mouth every  "day.      ibuprofen (MOTRIN) 600 MG Tab PLEASE SEE ATTACHED FOR DETAILED DIRECTIONS       No current facility-administered medications on file prior to visit.        Allergies:   Patient has no known allergies.    Problem List:   Patient Active Problem List   Diagnosis    Low testosterone    Benign prostatic hyperplasia with urinary obstruction    Erectile dysfunction    High cholesterol    Hypogonadism    Primary insomnia    Secondary polycythemia    Closed displaced fracture of fifth metatarsal bone of right foot    Elevated liver transaminase level    Erectile dysfunction due to arterial insufficiency    Mild sleep apnea    Chronic right-sided low back pain without sciatica        Surgical History:  Past Surgical History:   Procedure Laterality Date    [place] OPEN TREATMENT METATARSAL FRACTURE EACH Right 10/31/2023    Procedure: RIGHT FOOT NO FRACTURE OPEN REDUCTION INTERNAL FIXATION;  Surgeon: Med Chew M.D.;  Location: [facility];  Service: Orthopedics       Past Social Hx:   Social History     Tobacco Use    Smoking status: Never    Smokeless tobacco: Current     Types: Chew   Vaping Use    Vaping status: Never Used   Substance Use Topics    Alcohol use: Yes     Comment: Occ    Drug use: No          Problem list, medications, and allergies reviewed by myself today in Epic.     Objective:     /74   Pulse 95   Temp 36.4 °C (97.5 °F) (Temporal)   Resp 16   Ht 1.753 m (5' 9\")   Wt 122 kg (268 lb)   SpO2 95%   BMI 39.58 kg/m²     Physical Exam  Vitals and nursing note reviewed.   Constitutional:       General: He is not in acute distress.     Appearance: Normal appearance. He is not ill-appearing.   HENT:      Head: Normocephalic.      Right Ear: Tympanic membrane and ear canal normal.      Left Ear: Ear canal normal.      Nose: Nose normal.      Mouth/Throat:      Pharynx: Posterior oropharyngeal erythema present.   Cardiovascular:      Heart sounds: Normal heart sounds. "   Pulmonary:      Effort: Pulmonary effort is normal. No respiratory distress.      Breath sounds: No stridor. Examination of the right-upper field reveals wheezing. Examination of the left-upper field reveals wheezing. Examination of the right-middle field reveals wheezing. Examination of the left-middle field reveals wheezing. Examination of the right-lower field reveals decreased breath sounds and wheezing. Examination of the left-lower field reveals decreased breath sounds and wheezing. Decreased breath sounds and wheezing present. No rhonchi or rales.   Chest:      Chest wall: No tenderness.   Abdominal:      General: Bowel sounds are normal.      Palpations: Abdomen is soft.   Musculoskeletal:      Cervical back: No rigidity or tenderness.   Lymphadenopathy:      Cervical: No cervical adenopathy.   Neurological:      Mental Status: He is alert and oriented to person, place, and time.         Assessment/Plan:       Diagnosis and associated orders:   1. Wheezing  - albuterol 108 (90 Base) MCG/ACT Aero Soln inhalation aerosol; Inhale 2 Puffs every 6 hours as needed for Shortness of Breath.  Dispense: 8.5 g; Refill: 0  - ipratropium-albuterol (DUONEB) nebulizer solution  - guaiFENesin ER (MUCINEX) 600 MG TABLET SR 12 HR; Take 1 Tablet by mouth every 12 hours for 14 days.  Dispense: 28 Tablet; Refill: 0    2. Bronchitis  - albuterol (Proventil) 2.5mg/3ml nebulizer solution 2.5 mg  - benzonatate (TESSALON) 200 MG capsule; Take 1 Capsule by mouth 3 times a day as needed for Cough.  Dispense: 60 Capsule; Refill: 0  - methylPREDNISolone (MEDROL DOSEPAK) 4 MG Tablet Therapy Pack; Follow schedule on package instructions.  Dispense: 21 Tablet; Refill: 0  - ipratropium-albuterol (DUONEB) nebulizer solution    3. Acute bacterial rhinosinusitis  - amoxicillin-clavulanate (AUGMENTIN) 875-125 MG Tab; Take 1 Tablet by mouth 2 times a day for 7 days.  Dispense: 14 Tablet; Refill: 0    Other orders  - gabapentin (NEURONTIN) 300  MG Cap; TAKE 1 CAPSULE BY MOUTH EVERY DAY AT BEDTIME AS NEEDED FOR NERVE PAIN (Patient not taking: Reported on 4/9/2025)  - hydrOXYzine pamoate (VISTARIL) 25 MG Cap; TAKE 1 CAPSULE BY MOUTH 3 TIMES A DAY AS NEEDED FOR ITCHING OR OTHER (AND NAUSEA) FOR UP TO 3 DAYS. (Patient not taking: Reported on 4/9/2025)  - meloxicam (MOBIC) 7.5 MG Tab; Take 1 Tablet by mouth every day.  - ibuprofen (MOTRIN) 600 MG Tab; PLEASE SEE ATTACHED FOR DETAILED DIRECTIONS        Comments/MDM:     Patient presents with a 3-week history of productive cough with phlegm. Reports wheezing at the beginning and end of breathing, which was confirmed on examination. Cough worsens when lying down at night. No fever reported. Patient has a history of bronchitis occurring approximately every 5 years, lasting up to 6 months. Denies history of asthma, smoking, or pneumonia. No significant shortness of breath or chest pain reported. Patient attempted self-treatment with oral care and saline nasal rinse, which provided some relief. He declined imaging today.   Plan:          - Patient received nebulized albuterol in office, after which he reports some improvement in his symptoms. After an appropriate amount of time, duoneb was administered, which patient was CTAB after use. Patient tolerated well.          - Prescribed albuterol inhaler for home use          - Prescribe benzonatate for cough suppression          - Recommend follow-up if symptoms worsen or do not improve          - Consider chest X-ray should his symptoms fail to improve       Return to clinic or go to ED if symptoms worsen or persist. Indications for ED discussed at length. Patient/Parent/Guardian voices understanding. Follow-up with your primary care provider in 3-5 days. Red flag symptoms discussed. All side effects of medication discussed including allergic response, GI upset, tendon injury, rash, sedation etc.    Please note that this dictation was created using voice recognition  software. I have made a reasonable attempt to correct obvious errors, but I expect that there are errors of grammar and possibly content that I did not discover before finalizing the note.    This note was electronically signed by KSENIA Fernández